# Patient Record
Sex: MALE | Race: WHITE | Employment: PART TIME | URBAN - METROPOLITAN AREA
[De-identification: names, ages, dates, MRNs, and addresses within clinical notes are randomized per-mention and may not be internally consistent; named-entity substitution may affect disease eponyms.]

---

## 2023-04-01 ENCOUNTER — APPOINTMENT (OUTPATIENT)
Dept: GENERAL RADIOLOGY | Age: 28
End: 2023-04-01
Attending: NURSE PRACTITIONER
Payer: OTHER GOVERNMENT

## 2023-04-01 ENCOUNTER — HOSPITAL ENCOUNTER (EMERGENCY)
Age: 28
Discharge: HOME | End: 2023-04-01
Attending: STUDENT IN AN ORGANIZED HEALTH CARE EDUCATION/TRAINING PROGRAM
Payer: OTHER GOVERNMENT

## 2023-04-01 VITALS
DIASTOLIC BLOOD PRESSURE: 82 MMHG | TEMPERATURE: 98.6 F | OXYGEN SATURATION: 98 % | HEART RATE: 77 BPM | SYSTOLIC BLOOD PRESSURE: 142 MMHG | HEIGHT: 72 IN | RESPIRATION RATE: 18 BRPM | WEIGHT: 195 LBS | BODY MASS INDEX: 26.41 KG/M2

## 2023-04-01 DIAGNOSIS — M54.40 ACUTE BILATERAL LOW BACK PAIN WITH SCIATICA, SCIATICA LATERALITY UNSPECIFIED: Primary | ICD-10-CM

## 2023-04-01 PROCEDURE — 99283 EMERGENCY DEPT VISIT LOW MDM: CPT

## 2023-04-01 PROCEDURE — 72100 X-RAY EXAM L-S SPINE 2/3 VWS: CPT

## 2023-04-01 RX ORDER — DICLOFENAC SODIUM 75 MG/1
75 TABLET, DELAYED RELEASE ORAL 2 TIMES DAILY
Qty: 30 TABLET | Refills: 0 | Status: SHIPPED | OUTPATIENT
Start: 2023-04-01

## 2023-04-01 RX ORDER — CYCLOBENZAPRINE HCL 10 MG
10 TABLET ORAL
Qty: 15 TABLET | Refills: 0 | Status: SHIPPED | OUTPATIENT
Start: 2023-04-01

## 2023-04-01 RX ORDER — METHYLPREDNISOLONE 4 MG/1
TABLET ORAL
Qty: 1 DOSE PACK | Refills: 0 | Status: SHIPPED | OUTPATIENT
Start: 2023-04-01

## 2023-04-01 NOTE — ED PROVIDER NOTES
[facility] EMERGENCY DEPT  EMERGENCY DEPARTMENT ENCOUNTER       Pt Name: Osito Hopkins  MRN: [id number]  [place] 1995  Date of evaluation: 4/1/2023  Provider: Faye Stewart NP   PCP: None  Note Started: 5:57 PM 4/1/23     CHIEF COMPLAINT       Chief Complaint   Patient presents with    Back Pain        HISTORY OF PRESENT ILLNESS: 1 or more elements      History From: Patient  HPI Limitations : None     Osito Hopkins is a 28 y.o. male who presents ambulatory to the emergency department. Patient states that he had a back injury in 2021 when he was trying to lift a 45 pound generator from a war hawk. Patient is in the [employer]. Patient states that he has been running and now has coccyx pain which radiates down to his left leg and then this week has radiated down to his right leg. He denies a new injury. He denies bowel bladder incontinence. Nursing Notes were all reviewed and agreed with or any disagreements were addressed in the HPI. REVIEW OF SYSTEMS      Review of Systems   Constitutional:  Negative for chills, fatigue and fever. HENT:  Negative for congestion and sore throat. Eyes:  Negative for redness. Respiratory:  Negative for cough, chest tightness and wheezing. Cardiovascular:  Negative for chest pain. Gastrointestinal:  Negative for abdominal pain. Genitourinary:  Negative for dysuria. Musculoskeletal:  Positive for back pain. Negative for arthralgias and myalgias. Skin:  Negative for rash. Neurological:  Negative for dizziness, syncope, weakness, light-headedness, numbness and headaches. All other systems reviewed and are negative. Positives and Pertinent negatives as per HPI. PAST HISTORY     Past Medical History:  No past medical history on file. Past Surgical History:  Past Surgical History:   Procedure Laterality Date    HX CHOLECYSTECTOMY      HX ORTHOPAEDIC Left 2006    lower leg fracture       Family History:  No family history on file.     Social History: Allergies:  No Known Allergies    CURRENT MEDICATIONS      Discharge Medication List as of 4/1/2023  2:34 PM          PHYSICAL EXAM      ED Triage Vitals [04/01/23 1300]   ED Encounter Vitals Group      BP (!) 142/82      Pulse (Heart Rate) 77      Resp Rate 18      Temp 98.6 °F (37 °C)      Temp src       O2 Sat (%) 98 %      Weight 195 lb      Height 6'        Physical Exam  Vitals and nursing note reviewed. Constitutional:       Appearance: He is well-developed. HENT:      Head: Normocephalic and atraumatic. Right Ear: External ear normal.      Left Ear: External ear normal.      Nose: Nose normal.      Mouth/Throat:      Mouth: Mucous membranes are moist.   Eyes:      General:         Right eye: No discharge. Left eye: No discharge. Conjunctiva/sclera: Conjunctivae normal.   Cardiovascular:      Rate and Rhythm: Normal rate and regular rhythm. Heart sounds: Normal heart sounds. Pulmonary:      Effort: Pulmonary effort is normal. No respiratory distress. Breath sounds: Normal breath sounds. No wheezing. Abdominal:      Palpations: Abdomen is soft. Tenderness: There is no abdominal tenderness. Musculoskeletal:         General: Normal range of motion. Cervical back: Normal range of motion and neck supple. Comments: Bilateral LS spine paravertebral  Muscle tenderness . No midline tenderness DNV intact Negative SLR. Lymphadenopathy:      Cervical: No cervical adenopathy. Skin:     General: Skin is warm and dry. Neurological:      Mental Status: He is alert and oriented to person, place, and time. Cranial Nerves: No cranial nerve deficit. Psychiatric:         Behavior: Behavior normal.         Thought Content: Thought content normal.         Judgment: Judgment normal.        DIAGNOSTIC RESULTS   LABS:     No results found for this or any previous visit (from the past 12 hour(s)). EKG:  When ordered, EKG's are interpreted by the Emergency Department Physician in the absence of a cardiologist.  Please see their note for interpretation of EKG. RADIOLOGY:  Non-plain film images such as CT, Ultrasound and MRI are read by the radiologist. Plain radiographic images are visualized and preliminarily interpreted by the ED Provider with the below findings:          Interpretation per the Radiologist below, if available at the time of this note:     XR SPINE LUMB 2 OR 3 V    Result Date: 4/1/2023  INDICATION:   pain old injury COMPARISON: None FINDINGS: AP, lateral, and coned down lateral views of the lumbar spine demonstrate no acute fracture or subluxation. There are no significant degenerative changes. The sacroiliac joints are intact. No acute process. PROCEDURES   Unless otherwise noted below, none  Procedures     CRITICAL CARE TIME       EMERGENCY DEPARTMENT COURSE and DIFFERENTIAL DIAGNOSIS/MDM   Vitals:    Vitals:    04/01/23 1300   BP: (!) 142/82   Pulse: 77   Resp: 18   Temp: 98.6 °F (37 °C)   SpO2: 98%   Weight: 88.5 kg (195 lb)   Height: 6' (1.829 m)        Patient was given the following medications:  Medications - No data to display    CONSULTS: (Who and What was discussed)  None    Chronic Conditions: none    Social Determinants affecting Dx or Tx: None    Records Reviewed (source and summary of external records): Nursing notes    CC/HPI Summary, DDx, ED Course, and Reassessment: 19-year-old male presents with lower back pain. Patient states pain has reoccurred after he has been running reports lower back coccyx area pain which radiates to his left and right leg lifting injury in 2021 when back pain initially occurred. Exam bilateral paraspinal muscle tenderness LS-spine no bulging no step-off DNV intact negative straight leg raise differential diagnosis sprain strain DDD DDD spondylosis    ED Course as of 04/01/23 1757   Sat Apr 01, 2023   1418 X-ray no acute abnormality. I will discharge patient and refer to Ortho.   Prescribe NSAIDs muscle relaxer Medrol [facility] [AN]      ED Course User Index  [AN] Emi Minor NP       Disposition Considerations (Tests not done, Shared Decision Making, Pt Expectation of Test or Tx.):      FINAL IMPRESSION     1. Acute bilateral low back pain with sciatica, sciatica laterality unspecified          DISPOSITION/PLAN   Discharged    Discharge Note: The patient is stable for discharge home. The signs, symptoms, diagnosis, and discharge instructions have been discussed, understanding conveyed, and agreed upon. The patient is to follow up as recommended or return to ER should their symptoms worsen. PATIENT REFERRED TO:  Follow-up Information       Follow up With Specialties Details Why Contact Info    [address]  [place]  [id number] [phone]              DISCHARGE MEDICATIONS:  Discharge Medication List as of 4/1/2023  2:34 PM        START taking these medications    Details   methylPREDNISolone (Medrol, Brooks,) 4 mg tablet As directed, Normal, Disp-1 Dose Pack, R-0      cyclobenzaprine (FLEXERIL) 10 mg tablet Take 1 Tablet by mouth three (3) times daily as needed for Muscle Spasm(s). , Normal, Disp-15 Tablet, R-0      diclofenac EC (VOLTAREN) 75 mg EC tablet Take 1 Tablet by mouth two (2) times a day., Normal, Disp-30 Tablet, R-0               DISCONTINUED MEDICATIONS:  Discharge Medication List as of 4/1/2023  2:34 PM          ED Attending Involvement : I have seen and evaluated the patient. My supervision physician was available for consultation. I am the Primary Clinician of Record. Zoila Terrazas NP (electronically signed)    (Please note that parts of this dictation were completed with voice recognition software. Quite often unanticipated grammatical, syntax, homophones, and other interpretive errors are inadvertently transcribed by the computer software. Please disregards these errors.  Please excuse any errors that have escaped final proofreading.)